# Patient Record
Sex: FEMALE | Race: OTHER | NOT HISPANIC OR LATINO | URBAN - NONMETROPOLITAN AREA
[De-identification: names, ages, dates, MRNs, and addresses within clinical notes are randomized per-mention and may not be internally consistent; named-entity substitution may affect disease eponyms.]

---

## 2022-04-01 ENCOUNTER — OFFICE VISIT (OUTPATIENT)
Dept: URBAN - NONMETROPOLITAN AREA CLINIC 4 | Facility: CLINIC | Age: 57
End: 2022-04-01
Payer: COMMERCIAL

## 2022-04-01 ENCOUNTER — LAB OUTSIDE AN ENCOUNTER (OUTPATIENT)
Dept: URBAN - NONMETROPOLITAN AREA CLINIC 4 | Facility: CLINIC | Age: 57
End: 2022-04-01

## 2022-04-01 ENCOUNTER — DASHBOARD ENCOUNTERS (OUTPATIENT)
Age: 57
End: 2022-04-01

## 2022-04-01 DIAGNOSIS — K58.1 IRRITABLE BOWEL SYNDROME WITH CONSTIPATION: ICD-10-CM

## 2022-04-01 DIAGNOSIS — R10.9 RIGHT SIDED ABDOMINAL PAIN: ICD-10-CM

## 2022-04-01 DIAGNOSIS — R13.10 DYSPHAGIA, UNSPECIFIED TYPE: ICD-10-CM

## 2022-04-01 DIAGNOSIS — Z80.0 FAMILY HISTORY OF COLON CANCER: ICD-10-CM

## 2022-04-01 DIAGNOSIS — K21.9 GASTROESOPHAGEAL REFLUX DISEASE, UNSPECIFIED WHETHER ESOPHAGITIS PRESENT: ICD-10-CM

## 2022-04-01 DIAGNOSIS — Z86.010 PERSONAL HISTORY OF COLONIC POLYPS: ICD-10-CM

## 2022-04-01 DIAGNOSIS — E66.01 MORBID (SEVERE) OBESITY DUE TO EXCESS CALORIES: ICD-10-CM

## 2022-04-01 DIAGNOSIS — R19.4 CHANGE IN BOWEL HABITS: ICD-10-CM

## 2022-04-01 PROBLEM — 408512008: Status: ACTIVE | Noted: 2022-04-01

## 2022-04-01 PROBLEM — 83911000119104: Status: ACTIVE | Noted: 2022-04-01

## 2022-04-01 PROCEDURE — 99204 OFFICE O/P NEW MOD 45 MIN: CPT | Performed by: REGISTERED NURSE

## 2022-04-01 RX ORDER — SODIUM PICOSULFATE, MAGNESIUM OXIDE, AND ANHYDROUS CITRIC ACID 10; 3.5; 12 MG/160ML; G/160ML; G/160ML
160 ML LIQUID ORAL
Qty: 320 MILLILITER | Refills: 0 | OUTPATIENT
Start: 2022-04-01 | End: 2022-04-02

## 2022-04-01 RX ORDER — DICYCLOMINE HYDROCHLORIDE 20 MG/1
1 TABLET TABLET ORAL THREE TIMES A DAY
Qty: 90 | Refills: 1 | OUTPATIENT
Start: 2022-04-01 | End: 2022-05-31

## 2022-04-01 RX ORDER — PANTOPRAZOLE SODIUM 40 MG/1
1 TABLET TABLET, DELAYED RELEASE ORAL ONCE A DAY
Status: ACTIVE | COMMUNITY

## 2022-04-01 RX ORDER — LEVOTHYROXINE SODIUM 50 UG/1
1 TABLET IN THE MORNING ON AN EMPTY STOMACH TABLET ORAL ONCE A DAY
Status: ACTIVE | COMMUNITY

## 2022-04-01 NOTE — HPI-TODAY'S VISIT:
4/1/22: Pt is a 55 yo female with PMH of hypothyrodism, IBS, GERD, s/p CCY in 2006 who was referred by Dr. Javier Berumen for evaluation of abdominal pain.  A copy of this document will be sent to the referring physician.  Pt reports intermittent right sided abdominal pain that radiates into back/flank over past 6-8 months, worse in past couple of weeks. Pain is sharp and throbbing in nature. States she does not have regular BMs. Mostly has constipation followed by a few days of loose stools. Admits hard stools and straning. She has tried Metamucil with no improvement. Has associated gas and bloating. Drinks at least one DrTosha Pepper daily. Denies N/V, hematochezia or unintentional weight loss. Has had her gallbladder removed in 2006. She has a long standing hx of GERD and had taken Protonix for at least 5 years, but weaned herself off about 3 months. However, PCP started her back on it recently. She was also prescribed Carafate, but has not noticed a difference. She also reports more frequent dysphagia with liquids. Feels like she gets choked up whenever she drinks. Denies odynophagia. Denies etoh or tobacco use. Maternal GM and uncle had colon cancer. Mother has colon polyps. Last cscope 8 yrs ago by Dr. Tony Colmenares revealed 3 polyps.

## 2022-04-01 NOTE — PHYSICAL EXAM GASTROINTESTINAL
Abdomen , soft, TTP right side abdomen, excess abdominal fat, nondistended , no guarding or rigidity , no masses palpable , normal bowel sounds , Liver and Spleen , no hepatomegaly present , no hepatosplenomegaly , liver nontender , spleen not palpable

## 2022-04-01 NOTE — PHYSICAL EXAM CONSTITUTIONAL:
well developed, obese, well nourished , in no acute distress , ambulating without difficulty , normal communication ability

## 2022-04-02 LAB
A/G RATIO: 1.7
ALBUMIN: 4.2
ALKALINE PHOSPHATASE: 111
ALT (SGPT): 30
AST (SGOT): 18
BILIRUBIN, TOTAL: 0.3
BUN/CREATININE RATIO: 18
BUN: 15
CALCIUM: 9.2
CARBON DIOXIDE, TOTAL: 24
CHLORIDE: 102
CREATININE: 0.82
EGFR: 84
GLOBULIN, TOTAL: 2.5
GLUCOSE: 99
HEMATOCRIT: 42.8
HEMOGLOBIN: 13.7
LIPASE: 38
MCH: 28
MCHC: 32
MCV: 88
NRBC: (no result)
PLATELETS: 232
POTASSIUM: 4.2
PROTEIN, TOTAL: 6.7
RBC: 4.89
RDW: 13.1
SODIUM: 142
WBC: 7.7

## 2022-04-11 PROBLEM — 440630006: Status: ACTIVE | Noted: 2022-04-01

## 2022-04-11 PROBLEM — 428283002: Status: ACTIVE | Noted: 2022-04-01

## 2022-04-11 PROBLEM — 40739000: Status: ACTIVE | Noted: 2022-04-01

## 2022-04-11 PROBLEM — 235595009: Status: ACTIVE | Noted: 2022-04-01

## 2022-04-22 ENCOUNTER — WEB ENCOUNTER (OUTPATIENT)
Dept: URBAN - METROPOLITAN AREA SURGERY CENTER 14 | Facility: SURGERY CENTER | Age: 57
End: 2022-04-22

## 2022-04-28 ENCOUNTER — OFFICE VISIT (OUTPATIENT)
Dept: URBAN - METROPOLITAN AREA SURGERY CENTER 14 | Facility: SURGERY CENTER | Age: 57
End: 2022-04-28
Payer: COMMERCIAL

## 2022-04-28 DIAGNOSIS — D12.3 ADENOMA OF TRANSVERSE COLON: ICD-10-CM

## 2022-04-28 DIAGNOSIS — Z86.010 ADENOMAS PERSONAL HISTORY OF COLONIC POLYPS: ICD-10-CM

## 2022-04-28 DIAGNOSIS — K63.89 BACTERIAL OVERGROWTH SYNDROME: ICD-10-CM

## 2022-04-28 DIAGNOSIS — K22.89 ESOPHAGEAL BLEED, NON-VARICEAL: ICD-10-CM

## 2022-04-28 DIAGNOSIS — R13.19 CERVICAL DYSPHAGIA: ICD-10-CM

## 2022-04-28 DIAGNOSIS — K31.89 ACQUIRED DEFORMITY OF DUODENUM: ICD-10-CM

## 2022-04-28 PROCEDURE — 45385 COLONOSCOPY W/LESION REMOVAL: CPT | Performed by: INTERNAL MEDICINE

## 2022-04-28 PROCEDURE — 43239 EGD BIOPSY SINGLE/MULTIPLE: CPT | Performed by: INTERNAL MEDICINE

## 2022-04-28 PROCEDURE — 43248 EGD GUIDE WIRE INSERTION: CPT | Performed by: INTERNAL MEDICINE

## 2022-04-28 PROCEDURE — G8907 PT DOC NO EVENTS ON DISCHARG: HCPCS | Performed by: INTERNAL MEDICINE

## 2022-05-12 ENCOUNTER — OFFICE VISIT (OUTPATIENT)
Dept: URBAN - NONMETROPOLITAN AREA CLINIC 4 | Facility: CLINIC | Age: 57
End: 2022-05-12

## 2022-08-15 ENCOUNTER — TELEPHONE ENCOUNTER (OUTPATIENT)
Dept: URBAN - METROPOLITAN AREA CLINIC 23 | Facility: CLINIC | Age: 57
End: 2022-08-15